# Patient Record
Sex: MALE
[De-identification: names, ages, dates, MRNs, and addresses within clinical notes are randomized per-mention and may not be internally consistent; named-entity substitution may affect disease eponyms.]

---

## 2019-04-02 ENCOUNTER — HOSPITAL ENCOUNTER (EMERGENCY)
Dept: HOSPITAL 92 - ERS | Age: 29
Discharge: HOME | End: 2019-04-02
Payer: SELF-PAY

## 2019-04-02 DIAGNOSIS — R10.31: Primary | ICD-10-CM

## 2019-04-02 LAB
ALBUMIN SERPL BCG-MCNC: 4.7 G/DL (ref 3.5–5)
ALP SERPL-CCNC: 67 U/L (ref 40–150)
ALT SERPL W P-5'-P-CCNC: 13 U/L (ref 8–55)
ANION GAP SERPL CALC-SCNC: 12 MMOL/L (ref 10–20)
AST SERPL-CCNC: 15 U/L (ref 5–34)
BASOPHILS # BLD AUTO: 0.1 THOU/UL (ref 0–0.2)
BASOPHILS NFR BLD AUTO: 1.1 % (ref 0–1)
BILIRUB SERPL-MCNC: 1.7 MG/DL (ref 0.2–1.2)
BUN SERPL-MCNC: 11 MG/DL (ref 8.9–20.6)
CALCIUM SERPL-MCNC: 9.7 MG/DL (ref 7.8–10.44)
CHLORIDE SERPL-SCNC: 104 MMOL/L (ref 98–107)
CO2 SERPL-SCNC: 26 MMOL/L (ref 22–29)
CREAT CL PREDICTED SERPL C-G-VRATE: 0 ML/MIN (ref 70–130)
EOSINOPHIL # BLD AUTO: 0 THOU/UL (ref 0–0.7)
EOSINOPHIL NFR BLD AUTO: 0.2 % (ref 0–10)
GLOBULIN SER CALC-MCNC: 3.1 G/DL (ref 2.4–3.5)
GLUCOSE SERPL-MCNC: 106 MG/DL (ref 70–105)
HGB BLD-MCNC: 14.8 G/DL (ref 14–18)
LIPASE SERPL-CCNC: 21 U/L (ref 8–78)
LYMPHOCYTES # BLD: 2 THOU/UL (ref 1.2–3.4)
LYMPHOCYTES NFR BLD AUTO: 31 % (ref 21–51)
MCH RBC QN AUTO: 31 PG (ref 27–31)
MCV RBC AUTO: 89.2 FL (ref 78–98)
MONOCYTES # BLD AUTO: 0.4 THOU/UL (ref 0.11–0.59)
MONOCYTES NFR BLD AUTO: 5.8 % (ref 0–10)
NEUTROPHILS # BLD AUTO: 4 THOU/UL (ref 1.4–6.5)
NEUTROPHILS NFR BLD AUTO: 61.9 % (ref 42–75)
PLATELET # BLD AUTO: 281 THOU/UL (ref 130–400)
POTASSIUM SERPL-SCNC: 3.8 MMOL/L (ref 3.5–5.1)
RBC # BLD AUTO: 4.77 MILL/UL (ref 4.7–6.1)
SODIUM SERPL-SCNC: 138 MMOL/L (ref 136–145)
SP GR UR STRIP: 1.01 (ref 1–1.04)
WBC # BLD AUTO: 6.5 THOU/UL (ref 4.8–10.8)

## 2019-04-02 PROCEDURE — 85025 COMPLETE CBC W/AUTO DIFF WBC: CPT

## 2019-04-02 PROCEDURE — 81003 URINALYSIS AUTO W/O SCOPE: CPT

## 2019-04-02 PROCEDURE — 83690 ASSAY OF LIPASE: CPT

## 2019-04-02 PROCEDURE — 74177 CT ABD & PELVIS W/CONTRAST: CPT

## 2019-04-02 PROCEDURE — 80053 COMPREHEN METABOLIC PANEL: CPT

## 2019-04-02 NOTE — CT
FCT Abdomen Pelvis W Con: 4/2/2019 1:29 PM



CLINICAL INFORMATION: Right lower quadrant pain, x6 days. Possible lifting injury.



COMPARISON: None.



Procedure: 



Multiple contiguous axial images were obtained and a CT of the abdomen and pelvis with IV contrast. C
oronal reformats were performed.



FINDINGS:



Lower Chest: within normal limits.

Vessels: Visualized aorta has a normal caliber 



Abdomen:

Portal vein:Patent

Gallbladder: No calcified gallstones. Normal caliber wall.

Liver: within normal limits.

Pancreas: within normal limits.

Spleen: within normal limits.

Adrenals: within normal limits.

Kidneys: within normal limits.

Peritoneum: No ascites or free air, no fluid collection.

Bowel: Normal caliber. Normal caliber appendix. Sigmoid colon diverticulosis, without evidence of div
erticulitis.

Mesentery and Retroperitoneum: No retroperitoneal lymphadenopathy. There are scattered mildly enlarge
d mesenteric lymph nodes, nonspecific.

Abdominal Wall: within normal limits.



Pelvis:

Reproductive Organs: No pelvic masses.

Pelvis within normal limits.

Bladder: within normal limits.



Bones: within normal limits.  



IMPRESSION:



1.  Normal caliber appendix.

2.  Enlarged mesenteric lymph nodes. Correlate for mesenteric lymphadenitis



Reported By: Priya Osborn 

Electronically Signed:  4/2/2019 3:08 PM